# Patient Record
Sex: FEMALE | Race: ASIAN | Employment: STUDENT | URBAN - METROPOLITAN AREA
[De-identification: names, ages, dates, MRNs, and addresses within clinical notes are randomized per-mention and may not be internally consistent; named-entity substitution may affect disease eponyms.]

---

## 2018-04-05 PROBLEM — L70.0 PUSTULAR ACNE: Status: ACTIVE | Noted: 2018-04-05

## 2018-04-05 PROBLEM — R60.0 PEDAL EDEMA: Status: ACTIVE | Noted: 2018-04-05

## 2018-04-09 PROCEDURE — 81003 URINALYSIS AUTO W/O SCOPE: CPT | Performed by: INTERNAL MEDICINE

## 2018-11-24 ENCOUNTER — OFFICE VISIT (OUTPATIENT)
Dept: FAMILY MEDICINE CLINIC | Facility: CLINIC | Age: 23
End: 2018-11-24
Payer: COMMERCIAL

## 2018-11-24 VITALS
TEMPERATURE: 98 F | DIASTOLIC BLOOD PRESSURE: 76 MMHG | HEART RATE: 66 BPM | WEIGHT: 143 LBS | RESPIRATION RATE: 16 BRPM | HEIGHT: 65 IN | OXYGEN SATURATION: 98 % | SYSTOLIC BLOOD PRESSURE: 106 MMHG | BODY MASS INDEX: 23.82 KG/M2

## 2018-11-24 DIAGNOSIS — J02.9 SORE THROAT: Primary | ICD-10-CM

## 2018-11-24 DIAGNOSIS — J04.0 LARYNGITIS: ICD-10-CM

## 2018-11-24 PROCEDURE — 99213 OFFICE O/P EST LOW 20 MIN: CPT | Performed by: NURSE PRACTITIONER

## 2018-11-24 NOTE — PATIENT INSTRUCTIONS
Laryngitis    Laryngitis is a swelling of the tissues around the vocal cords. Symptoms include a hoarse (scratchy) voice. Or your voice may be gone for a few days or longer. This may be caused by a viral illness, such as a head or chest cold.  It may also

## 2018-11-24 NOTE — PROGRESS NOTES
CHIEF COMPLAINT:   Patient presents with:  Sore Throat        HPI:   Paula Pandey is a 26year old female presents to clinic with complaint loss of voice for 2 days.  Runny nose, nasal congestion, cough, and body aches started 4 days ago and all have res THROAT: oral mucosa pink, moist. Posterior pharynx is pink and clear without exudates. Tonsils 1/4. Breath is not malodorous. No trismus, hoarseness, muffled voice, stridor, or uvular deviation. NECK: supple.  Thyroid: No tenderness, nodes or enlargemen Follow up with your healthcare provider or this facility if you are not better after 1 week.  If your hoarse voice lasts more than 2 weeks, you may need to see an otolaryngologist. This is a doctor who treats diseases and disorders of the ear, nose, and thr

## 2019-11-29 ENCOUNTER — IMMUNIZATION (OUTPATIENT)
Dept: FAMILY MEDICINE CLINIC | Facility: CLINIC | Age: 24
End: 2019-11-29
Payer: COMMERCIAL

## 2019-11-29 DIAGNOSIS — Z23 NEED FOR VACCINATION: ICD-10-CM

## 2019-11-29 PROCEDURE — 90686 IIV4 VACC NO PRSV 0.5 ML IM: CPT | Performed by: NURSE PRACTITIONER

## 2019-11-29 PROCEDURE — 90471 IMMUNIZATION ADMIN: CPT | Performed by: NURSE PRACTITIONER

## 2024-01-21 ENCOUNTER — HOSPITAL ENCOUNTER (OUTPATIENT)
Age: 29
Discharge: HOME | End: 2024-01-21
Payer: COMMERCIAL

## 2024-01-21 VITALS
HEART RATE: 86 BPM | TEMPERATURE: 98 F | DIASTOLIC BLOOD PRESSURE: 75 MMHG | OXYGEN SATURATION: 99 % | SYSTOLIC BLOOD PRESSURE: 115 MMHG | RESPIRATION RATE: 18 BRPM

## 2024-01-21 DIAGNOSIS — J06.9 VIRAL URI WITH COUGH: ICD-10-CM

## 2024-01-21 DIAGNOSIS — B00.1 COLD SORE: Primary | ICD-10-CM

## 2024-01-21 LAB — SARS-COV-2 RNA RESP QL NAA+PROBE: NOT DETECTED

## 2024-01-21 PROCEDURE — 99203 OFFICE O/P NEW LOW 30 MIN: CPT

## 2024-01-21 RX ORDER — VALACYCLOVIR HYDROCHLORIDE 1 G/1
2000 TABLET, FILM COATED ORAL 2 TIMES DAILY
Qty: 4 TABLET | Refills: 0 | Status: SHIPPED | OUTPATIENT
Start: 2024-01-21 | End: 2024-01-22

## 2024-01-21 NOTE — ED PROVIDER NOTES
Patient Seen in: [facility]      History     Chief Complaint   Patient presents with    Mouth/Lip Problem    Runny Nose     Stated Complaint: fever soar, sore throat    Subjective:   28-year-old female with anemia presents from home with cold symptoms.  Complaining of cough, runny nose, congestion for about 1 week.  No fever.  No shortness of breath.  Developed lip sores several days ago that have increased since using abreva.  Initially had sores to the left corner of her mouth.  States now she has several scattered across to her lips.  None in her mouth.  Previous history of cold sores, but states she is having more than usual.  She did have a negative COVID test at home.  She has been taking Tylenol at home.  She is vaccinated for COVID.  Non-smoker    The history is provided by the patient. No  was used.         Objective:   Past Medical History:   Diagnosis Date    Iron deficiency anemia               No pertinent past surgical history.              No pertinent social history.            Review of Systems    Positive for stated complaint: fever soar, sore throat  Other systems are as noted in HPI.  Constitutional and vital signs reviewed.      All other systems reviewed and negative except as noted above.    Physical Exam     ED Triage Vitals [01/21/24 1350]   /75   Pulse 86   Resp 18   Temp 98.4 °F (36.9 °C)   Temp src Temporal   SpO2 99 %   O2 Device None (Room air)       Current:/75   Pulse 86   Temp 98.4 °F (36.9 °C) (Temporal)   Resp 18   LMP 12/31/2023 (Approximate)   SpO2 99%         Physical Exam  Vitals and nursing note reviewed.   Constitutional:       General: She is not in acute distress.     Appearance: Normal appearance. She is not ill-appearing or toxic-appearing.   HENT:      Head: Normocephalic and atraumatic.      Right Ear: Tympanic membrane, ear canal and external ear normal.      Left Ear: Tympanic membrane, ear canal and external ear  normal.      Nose: Nose normal.      Mouth/Throat:      Lips: Lesions present.      Mouth: Mucous membranes are moist.      Pharynx: Oropharynx is clear. No pharyngeal swelling or posterior oropharyngeal erythema.      Tonsils: No tonsillar exudate.      Comments: Small raised vesicular bumps to lips, mostly clustered to left upper lip/corner of mouth. Another vesicle to middle of lower lip and upper lip. Some yellow crusting to corner of mouth. No tongue lesions.   Eyes:      Pupils: Pupils are equal, round, and reactive to light.   Cardiovascular:      Rate and Rhythm: Normal rate and regular rhythm.      Pulses: Normal pulses.   Pulmonary:      Effort: Pulmonary effort is normal. No respiratory distress.      Breath sounds: Normal breath sounds.      Comments: Lungs clear.  No adventitious lung sounds.  No distress.  No hypoxia.  Pulse ox 99% ra. Which is normal    Abdominal:      General: Abdomen is flat.      Palpations: Abdomen is soft.   Musculoskeletal:         General: No signs of injury. Normal range of motion.      Cervical back: Normal range of motion and neck supple.   Lymphadenopathy:      Cervical: No cervical adenopathy.   Skin:     General: Skin is warm and dry.      Capillary Refill: Capillary refill takes less than 2 seconds.   Neurological:      General: No focal deficit present.      Mental Status: She is alert and oriented to person, place, and time.      GCS: GCS eye subscore is 4. GCS verbal subscore is 5. GCS motor subscore is 6.   Psychiatric:         Mood and Affect: Mood normal.         Behavior: Behavior normal.         Thought Content: Thought content normal.         Judgment: Judgment normal.               ED Course     Labs Reviewed   RAPID SARS-COV-2 BY PCR - Normal     Recent Results (from the past 24 hour(s))   Rapid SARS-CoV-2 by PCR    Collection Time: 01/21/24  1:51 PM    Specimen: Nares; Other   Result Value Ref Range    Rapid SARS-CoV-2 by PCR Not Detected Not Detected        [facility]          Medical Decision Making  Viral URI with cough  Cold symptoms for about a week but nontoxic-appearing on exam  COVID-negative  No evidence of pneumonia  No evidence of otitis media  Symptoms appear viral    Cold sores  Vesicular lesions on her lips, consistent with herpes simplex.  Previous history of cold sores.  > 48 hours into symptoms but vesicles are still spreading so will treat with valacyclovir - recurrent infection dosing  She does have some yellow crusting to the corner of her mouth that may be consistent with impetigo.  Recommend Bactrim.  Reassurance provided    Results and plan of care discussed with the patient/family. They are in agreement with discharge. They understand to follow up with their primary doctor or the referral physician for further evaluation, especially if no improvement.  Also discussed the limitations of immediate care, patient is aware that if symptoms are worse they should go to the emergency room. Verbal and written discharge instructions were given.       Problems Addressed:  Cold sore: acute illness or injury  Viral URI with cough: acute illness or injury    Amount and/or Complexity of Data Reviewed  Labs: ordered. Decision-making details documented in ED Course.    Risk  OTC drugs.  Prescription drug management.        Disposition and Plan     Clinical Impression:  1. Cold sore    2. Viral URI with cough         Disposition:  Discharge  1/21/2024  2:13 pm    Follow-up:  Leia Cerna  [address] [address] [phone]                Medications Prescribed:  Discharge Medication List as of 1/21/2024  2:15 PM        START taking these medications    Details   valACYclovir 1 G Oral Tab Take 2 tablets (2,000 mg total) by mouth in the morning and 2 tablets (2,000 mg total) before bedtime. Do all this for 1 day., Normal, Disp-4 tablet, R-0      mupirocin 2 % External Ointment Apply 1 Application topically 3 (three) times daily for 7  days., Normal, Disp-22 g, R-0

## 2024-01-21 NOTE — DISCHARGE INSTRUCTIONS
COVID test is negative.  Take the antiviral as directed.  Apply the antibiotic cream to the crusting in the corner of your mouth.  Use Vaseline on your lips.  Wash your hands often.  Follow-up with your primary doctor

## 2024-01-21 NOTE — ED INITIAL ASSESSMENT (HPI)
Patient reports having a runny nose with nasal congestion and cough for about 1 week. Patient taking Tylenol PRN at home, however patient states she has sores on her lip that are painful. The mouth sores seemed to have progressed and spread after taking Abreva per patient.